# Patient Record
Sex: MALE | Race: ASIAN | NOT HISPANIC OR LATINO | URBAN - METROPOLITAN AREA
[De-identification: names, ages, dates, MRNs, and addresses within clinical notes are randomized per-mention and may not be internally consistent; named-entity substitution may affect disease eponyms.]

---

## 2017-12-07 ENCOUNTER — EMERGENCY (EMERGENCY)
Facility: HOSPITAL | Age: 32
LOS: 1 days | Discharge: HOME | End: 2017-12-07
Attending: EMERGENCY MEDICINE | Admitting: EMERGENCY MEDICINE
Payer: MEDICAID

## 2017-12-07 VITALS
SYSTOLIC BLOOD PRESSURE: 141 MMHG | RESPIRATION RATE: 18 BRPM | DIASTOLIC BLOOD PRESSURE: 96 MMHG | OXYGEN SATURATION: 99 % | HEART RATE: 81 BPM | TEMPERATURE: 98 F

## 2017-12-07 VITALS
SYSTOLIC BLOOD PRESSURE: 141 MMHG | RESPIRATION RATE: 16 BRPM | OXYGEN SATURATION: 100 % | HEART RATE: 84 BPM | DIASTOLIC BLOOD PRESSURE: 98 MMHG

## 2017-12-07 LAB
ALBUMIN SERPL ELPH-MCNC: 4.5 G/DL — SIGNIFICANT CHANGE UP (ref 3.3–5)
ALP SERPL-CCNC: 55 U/L — SIGNIFICANT CHANGE UP (ref 40–120)
ALT FLD-CCNC: 49 U/L — HIGH (ref 4–41)
AST SERPL-CCNC: 31 U/L — SIGNIFICANT CHANGE UP (ref 4–40)
BASOPHILS # BLD AUTO: 0.03 K/UL — SIGNIFICANT CHANGE UP (ref 0–0.2)
BASOPHILS NFR BLD AUTO: 0.4 % — SIGNIFICANT CHANGE UP (ref 0–2)
BILIRUB SERPL-MCNC: 0.9 MG/DL — SIGNIFICANT CHANGE UP (ref 0.2–1.2)
BUN SERPL-MCNC: 11 MG/DL — SIGNIFICANT CHANGE UP (ref 7–23)
CALCIUM SERPL-MCNC: 9 MG/DL — SIGNIFICANT CHANGE UP (ref 8.4–10.5)
CHLORIDE SERPL-SCNC: 101 MMOL/L — SIGNIFICANT CHANGE UP (ref 98–107)
CO2 SERPL-SCNC: 23 MMOL/L — SIGNIFICANT CHANGE UP (ref 22–31)
CREAT SERPL-MCNC: 0.81 MG/DL — SIGNIFICANT CHANGE UP (ref 0.5–1.3)
EOSINOPHIL # BLD AUTO: 0.19 K/UL — SIGNIFICANT CHANGE UP (ref 0–0.5)
EOSINOPHIL NFR BLD AUTO: 2.6 % — SIGNIFICANT CHANGE UP (ref 0–6)
GLUCOSE SERPL-MCNC: 105 MG/DL — HIGH (ref 70–99)
HCT VFR BLD CALC: 46.3 % — SIGNIFICANT CHANGE UP (ref 39–50)
HGB BLD-MCNC: 15.5 G/DL — SIGNIFICANT CHANGE UP (ref 13–17)
HIV1 AG SER QL: SIGNIFICANT CHANGE UP
HIV1+2 AB SPEC QL: SIGNIFICANT CHANGE UP
IMM GRANULOCYTES # BLD AUTO: 0.03 # — SIGNIFICANT CHANGE UP
IMM GRANULOCYTES NFR BLD AUTO: 0.4 % — SIGNIFICANT CHANGE UP (ref 0–1.5)
LYMPHOCYTES # BLD AUTO: 2.64 K/UL — SIGNIFICANT CHANGE UP (ref 1–3.3)
LYMPHOCYTES # BLD AUTO: 36.8 % — SIGNIFICANT CHANGE UP (ref 13–44)
MCHC RBC-ENTMCNC: 28.2 PG — SIGNIFICANT CHANGE UP (ref 27–34)
MCHC RBC-ENTMCNC: 33.5 % — SIGNIFICANT CHANGE UP (ref 32–36)
MCV RBC AUTO: 84.3 FL — SIGNIFICANT CHANGE UP (ref 80–100)
MONOCYTES # BLD AUTO: 0.62 K/UL — SIGNIFICANT CHANGE UP (ref 0–0.9)
MONOCYTES NFR BLD AUTO: 8.6 % — SIGNIFICANT CHANGE UP (ref 2–14)
NEUTROPHILS # BLD AUTO: 3.67 K/UL — SIGNIFICANT CHANGE UP (ref 1.8–7.4)
NEUTROPHILS NFR BLD AUTO: 51.2 % — SIGNIFICANT CHANGE UP (ref 43–77)
NRBC # FLD: 0 — SIGNIFICANT CHANGE UP
PLATELET # BLD AUTO: 211 K/UL — SIGNIFICANT CHANGE UP (ref 150–400)
PMV BLD: 9.5 FL — SIGNIFICANT CHANGE UP (ref 7–13)
POTASSIUM SERPL-MCNC: 4.2 MMOL/L — SIGNIFICANT CHANGE UP (ref 3.5–5.3)
POTASSIUM SERPL-SCNC: 4.2 MMOL/L — SIGNIFICANT CHANGE UP (ref 3.5–5.3)
PROT SERPL-MCNC: 7.7 G/DL — SIGNIFICANT CHANGE UP (ref 6–8.3)
RBC # BLD: 5.49 M/UL — SIGNIFICANT CHANGE UP (ref 4.2–5.8)
RBC # FLD: 14 % — SIGNIFICANT CHANGE UP (ref 10.3–14.5)
SODIUM SERPL-SCNC: 138 MMOL/L — SIGNIFICANT CHANGE UP (ref 135–145)
TROPONIN T SERPL-MCNC: < 0.06 NG/ML — SIGNIFICANT CHANGE UP (ref 0–0.06)
TROPONIN T SERPL-MCNC: < 0.06 NG/ML — SIGNIFICANT CHANGE UP (ref 0–0.06)
WBC # BLD: 7.18 K/UL — SIGNIFICANT CHANGE UP (ref 3.8–10.5)
WBC # FLD AUTO: 7.18 K/UL — SIGNIFICANT CHANGE UP (ref 3.8–10.5)

## 2017-12-07 PROCEDURE — 93010 ELECTROCARDIOGRAM REPORT: CPT

## 2017-12-07 PROCEDURE — 99285 EMERGENCY DEPT VISIT HI MDM: CPT | Mod: 25

## 2017-12-07 PROCEDURE — 71020: CPT | Mod: 26

## 2017-12-07 NOTE — ED PROVIDER NOTE - ATTENDING CONTRIBUTION TO CARE
Dr. Vega:  I have personally performed a face to face bedside history and physical examination of this patient. I have discussed the history, examination, review of systems, assessment and plan of management with the resident. I have reviewed the electronic medical record and amended it to reflect my history, review of systems, physical exam, assessment and plan.    32M denies pmh presents with chest pain.  Pt states he experienced sharp chest pain yesterday while at rest, exacerbated by turning neck.  Had another episode today lasting 2p-5p, again onset while at rest.  Had associated paresthesia sensation in LUE.  Family h/o strokes and DM.  Denies fever/chills, sob, n/v/d, recent travel.      Exam:  - nad  - rrr  - ctab  - abd soft ntnd  - no pedal edema    A/P  - chest pain, r/o ACS  - cbc, cmp, trop  - ekg  - cxr

## 2017-12-07 NOTE — ED PROVIDER NOTE - OBJECTIVE STATEMENT
32M with no PMHx p/w left sided CP x2 days 32M with no PMHx p/w left sided CP x2 days. Patient states CP is left sided and constant yesterday. Patient went to sleep and woke up w/o pain. Pain restarted today while watching TV. Today pain is intermittent and sharp, worse when he is turning his head to the right. Patient then felt numbness of his upper arm with lasted about 30 seconds and resolved spontaneously. currently w/o CP or numbness/tingling. took ASA 162mg at about 4pm.

## 2017-12-07 NOTE — ED ADULT TRIAGE NOTE - CHIEF COMPLAINT QUOTE
Pt co left and right sided chest pain since yesterday. States today had episode of tingling in left arm and tingling in his mouth. Denies pmh.

## 2018-07-18 ENCOUNTER — EMERGENCY (EMERGENCY)
Facility: HOSPITAL | Age: 33
LOS: 1 days | Discharge: HOME | End: 2018-07-18
Admitting: EMERGENCY MEDICINE
Payer: MEDICAID

## 2018-07-18 VITALS
SYSTOLIC BLOOD PRESSURE: 139 MMHG | OXYGEN SATURATION: 100 % | HEART RATE: 76 BPM | DIASTOLIC BLOOD PRESSURE: 96 MMHG | RESPIRATION RATE: 18 BRPM | TEMPERATURE: 98 F

## 2018-07-18 PROCEDURE — 99284 EMERGENCY DEPT VISIT MOD MDM: CPT | Mod: 25

## 2018-07-18 NOTE — ED ADULT TRIAGE NOTE - CHIEF COMPLAINT QUOTE
C/o right sided neck and shoulder pain with paresthesia to right fingers. Denies CP or SOB, upper extremity strength equal bilaterally, no neuro deficits noted. Denies other PMH EKG completed

## 2018-07-19 VITALS
OXYGEN SATURATION: 100 % | HEART RATE: 65 BPM | DIASTOLIC BLOOD PRESSURE: 79 MMHG | SYSTOLIC BLOOD PRESSURE: 120 MMHG | RESPIRATION RATE: 16 BRPM | TEMPERATURE: 97 F

## 2018-07-19 PROCEDURE — 73030 X-RAY EXAM OF SHOULDER: CPT | Mod: 26,RT

## 2018-07-19 RX ORDER — DIAZEPAM 5 MG
5 TABLET ORAL ONCE
Qty: 0 | Refills: 0 | Status: DISCONTINUED | OUTPATIENT
Start: 2018-07-19 | End: 2018-07-19

## 2018-07-19 RX ORDER — ACETAMINOPHEN 500 MG
1000 TABLET ORAL EVERY 6 HOURS
Qty: 0 | Refills: 0 | Status: DISCONTINUED | OUTPATIENT
Start: 2018-07-19 | End: 2018-07-19

## 2018-07-19 RX ORDER — CYCLOBENZAPRINE HYDROCHLORIDE 10 MG/1
1 TABLET, FILM COATED ORAL
Qty: 5 | Refills: 0 | OUTPATIENT
Start: 2018-07-19 | End: 2018-07-23

## 2018-07-19 RX ORDER — ACETAMINOPHEN 500 MG
650 TABLET ORAL EVERY 6 HOURS
Qty: 0 | Refills: 0 | Status: DISCONTINUED | OUTPATIENT
Start: 2018-07-19 | End: 2018-07-22

## 2018-07-19 RX ADMIN — Medication 650 MILLIGRAM(S): at 03:10

## 2018-07-19 RX ADMIN — Medication 650 MILLIGRAM(S): at 02:42

## 2018-07-19 RX ADMIN — Medication 5 MILLIGRAM(S): at 02:42

## 2018-07-19 NOTE — ED PROVIDER NOTE - OBJECTIVE STATEMENT
32 Y/O M no PMH h/o knee and shoulder surgery 3+ Years ago C/O r shoulder pain and tingling in his first and second digit for the past 1-2 days. Patient states the pain started when he woke up, He denies trauma or specific injury, denies dark urine or recent intense physical exertion, denies any other complaints at this time such as HA, CP SOB ABD PN N V D DIzz.

## 2018-07-19 NOTE — ED PROVIDER NOTE - MUSCULOSKELETAL MINIMAL EXAM
FROM actively and passively, moderate pain with Passive rom, no erythema, edema or warmth over the joint./normal range of motion

## 2018-07-19 NOTE — ED PROVIDER NOTE - PROGRESS NOTE DETAILS
Patient given Valium, patient states that he did not drive here. Patient notes reduced pain. No other changes. Patient told to f.u  with ortho and given ortho list

## 2018-07-19 NOTE — ED PROVIDER NOTE - CARE PLAN
Principal Discharge DX:	Pain in right shoulder Principal Discharge DX:	Pain in right shoulder  Assessment and plan of treatment:	Follow up with an orthopedist, referral is provided in your discharge packet. DO NOT drive, use heavy machinery or perform potentially risky activities after taking flexaril as it can make you drowsy. Advance activity as tolerated.  Continue all previously prescribed medications as directed.  Follow up with your primary care physician in 48-72 hours- bring copies of your results.  Return to the ER for worsening or persistent symptoms, and/or ANY NEW OR CONCERNING SYMPTOMS. If you have issues obtaining follow up, please call: [phone] to obtain a doctor or specialist who takes your insurance in your area.  You may call [phone] to make an appointment with the internal medicine clinic.

## 2018-07-19 NOTE — ED PROVIDER NOTE - PLAN OF CARE
Follow up with an orthopedist, referral is provided in your discharge packet. DO NOT drive, use heavy machinery or perform potentially risky activities after taking flexaril as it can make you drowsy. Advance activity as tolerated.  Continue all previously prescribed medications as directed.  Follow up with your primary care physician in 48-72 hours- bring copies of your results.  Return to the ER for worsening or persistent symptoms, and/or ANY NEW OR CONCERNING SYMPTOMS. If you have issues obtaining follow up, please call: [phone] to obtain a doctor or specialist who takes your insurance in your area.  You may call [phone] to make an appointment with the internal medicine clinic.

## 2018-07-19 NOTE — ED PROVIDER NOTE - MEDICAL DECISION MAKING DETAILS
Patient C/O atraumatic R shoulder pain, notes tingling near fingers, no signs of trauma or septic joint. plan is D/C with ortho f/u x rays unremarkable; no fx or dislocation.

## 2019-01-03 ENCOUNTER — EMERGENCY (EMERGENCY)
Facility: HOSPITAL | Age: 34
LOS: 1 days | Discharge: HOME | End: 2019-01-03
Attending: EMERGENCY MEDICINE | Admitting: EMERGENCY MEDICINE
Payer: MEDICAID

## 2019-01-03 VITALS
TEMPERATURE: 98 F | HEART RATE: 109 BPM | OXYGEN SATURATION: 97 % | SYSTOLIC BLOOD PRESSURE: 149 MMHG | DIASTOLIC BLOOD PRESSURE: 85 MMHG | RESPIRATION RATE: 18 BRPM

## 2019-01-03 PROCEDURE — 71046 X-RAY EXAM CHEST 2 VIEWS: CPT | Mod: 26

## 2019-01-03 PROCEDURE — 99283 EMERGENCY DEPT VISIT LOW MDM: CPT

## 2019-01-03 RX ORDER — KETOROLAC TROMETHAMINE 30 MG/ML
15 SYRINGE (ML) INJECTION ONCE
Qty: 0 | Refills: 0 | Status: DISCONTINUED | OUTPATIENT
Start: 2019-01-03 | End: 2019-01-03

## 2019-01-03 RX ADMIN — Medication 15 MILLIGRAM(S): at 22:47

## 2019-01-03 NOTE — ED PROVIDER NOTE - MEDICAL DECISION MAKING DETAILS
34 y/o M w/ cough and myalgias. Flu vs. other viral syndromes vs. pneumonia. Will obtain chest X-ray, give NSAIDs and reassess.

## 2019-01-03 NOTE — ED PROVIDER NOTE - OBJECTIVE STATEMENT
34 y/o M w/ no pertinent PMH presents to ED c/o cough and muscle aches, especially at the L trapezius, for 2 days duration. Pt also w/ subjective fever. Denies any sick contacts, or any other acute complaints. NKDA. Pt has not received flu vaccination this year.

## 2019-01-03 NOTE — ED PROVIDER NOTE - PLAN OF CARE
You were seen today for your cough.  This is likely due to an upper respiratory infection from a virus.  Antibiotics are not helpful in this case.  Take acetaminophen for any pain or fever.  Drink plenty of fluids.  Be sure to follow up with your primary care physician in 2-3 days for re-evaluation.  RETURN TO THE EMERGENCY DEPARTMENT IMMEDIATELY FOR SEVERE WEAKNESS, TROUBLE BREATHING, IF YOU CANNOT EAT OR DRINK, OR FOR ANY OTHER CONCERN.

## 2019-01-03 NOTE — ED ADULT TRIAGE NOTE - CHIEF COMPLAINT QUOTE
Pt c/o cough x 2 days and CP that started today. CP worsens and radiates to L shoulder when coughing. Also having subjective fever. Denies pmhx.

## 2019-01-03 NOTE — ED PROVIDER NOTE - CARE PLAN
Principal Discharge DX:	URI (upper respiratory infection)  Assessment and plan of treatment:	You were seen today for your cough.  This is likely due to an upper respiratory infection from a virus.  Antibiotics are not helpful in this case.  Take acetaminophen for any pain or fever.  Drink plenty of fluids.  Be sure to follow up with your primary care physician in 2-3 days for re-evaluation.  RETURN TO THE EMERGENCY DEPARTMENT IMMEDIATELY FOR SEVERE WEAKNESS, TROUBLE BREATHING, IF YOU CANNOT EAT OR DRINK, OR FOR ANY OTHER CONCERN.

## 2019-01-04 RX ORDER — CYCLOBENZAPRINE HYDROCHLORIDE 10 MG/1
1 TABLET, FILM COATED ORAL
Qty: 20 | Refills: 0 | OUTPATIENT
Start: 2019-01-04

## 2020-03-13 ENCOUNTER — EMERGENCY (EMERGENCY)
Facility: HOSPITAL | Age: 35
LOS: 1 days | Discharge: HOME | End: 2020-03-13
Attending: EMERGENCY MEDICINE | Admitting: EMERGENCY MEDICINE
Payer: MEDICAID

## 2020-03-13 VITALS
SYSTOLIC BLOOD PRESSURE: 141 MMHG | OXYGEN SATURATION: 100 % | HEART RATE: 78 BPM | WEIGHT: 199.96 LBS | RESPIRATION RATE: 16 BRPM | DIASTOLIC BLOOD PRESSURE: 99 MMHG | HEIGHT: 67 IN | TEMPERATURE: 99 F

## 2020-03-13 VITALS
DIASTOLIC BLOOD PRESSURE: 81 MMHG | SYSTOLIC BLOOD PRESSURE: 118 MMHG | TEMPERATURE: 99 F | RESPIRATION RATE: 16 BRPM | HEART RATE: 77 BPM | OXYGEN SATURATION: 100 %

## 2020-03-13 PROCEDURE — 99284 EMERGENCY DEPT VISIT MOD MDM: CPT

## 2020-03-13 RX ORDER — KETOROLAC TROMETHAMINE 30 MG/ML
30 SYRINGE (ML) INJECTION ONCE
Refills: 0 | Status: DISCONTINUED | OUTPATIENT
Start: 2020-03-13 | End: 2020-03-13

## 2020-03-13 RX ORDER — DEXAMETHASONE 0.5 MG/5ML
8 ELIXIR ORAL ONCE
Refills: 0 | Status: COMPLETED | OUTPATIENT
Start: 2020-03-13 | End: 2020-03-13

## 2020-03-13 NOTE — ED ADULT NURSE NOTE - PAIN RATING/NUMBER SCALE (0-10): REST
10 Bexarotene Counseling:  I discussed with the patient the risks of bexarotene including but not limited to hair loss, dry lips/skin/eyes, liver abnormalities, hyperlipidemia, pancreatitis, depression/suicidal ideation, photosensitivity, drug rash/allergic reactions, hypothyroidism, anemia, leukopenia, infection, cataracts, and teratogenicity.  Patient understands that they will need regular blood tests to check lipid profile, liver function tests, white blood cell count, thyroid function tests and pregnancy test if applicable.

## 2020-03-13 NOTE — ED ADULT NURSE NOTE - OBJECTIVE STATEMENT
c/o cough/cold symptoms since wednesday... cough with green sputum, chest and back pain... pregnant gf also has same symptoms.  [facility]

## 2020-03-14 RX ADMIN — Medication 8 MILLIGRAM(S): at 00:06

## 2020-03-14 RX ADMIN — Medication 30 MILLIGRAM(S): at 00:07

## 2020-03-14 NOTE — ED PROVIDER NOTE - CLINICAL SUMMARY MEDICAL DECISION MAKING FREE TEXT BOX
34 y/o M with body aches and chills will treat with Toradol, Decadron and outpatient follow up with PMD. DC home.

## 2020-03-14 NOTE — ED PROVIDER NOTE - OBJECTIVE STATEMENT
36 y/o M presents to the ED c/o body aches, lower back pain, chills. +sick contact with nephew and pregnant girlfriend. Otherwise no other complaints. Denies cough, CP, n/v/d, abdominal pain.

## 2020-03-14 NOTE — ED PROVIDER NOTE - PATIENT PORTAL LINK FT
You can access the FollowMyHealth Patient Portal offered by [facility] by registering at the following website: [email or web address]. By joining [facility]’s FollowMyHealth portal, you will also be able to view your health information using other applications (apps) compatible with our system.

## 2020-06-25 NOTE — ED ADULT NURSE NOTE - CHIEF COMPLAINT QUOTE
Dx: sprain IP joint R D5        Insurance (Authorized # of Visits):  PPO           Authorizing Physician: Dr. Raghu Wiggins  Next MD visit: none scheduled  Fall Risk: standard         Precautions: n/a             Subjective:\"I've been able to wear the cast and I Pt. c/o fever, sob and back pain, body aches and some chest pain x 2 weeks. Gentle mobilization of joint followed by extension of PIP Gentle mobilization of joint followed by extension of PIP ORL stretch Blocking, reverse blocking   Yellow band ex  Digit/thumb ext x 20  D5 abd x 20  Isometric extension of D5, EDC also common digit

## 2025-03-28 ENCOUNTER — OFFICE (OUTPATIENT)
Facility: LOCATION | Age: 40
Setting detail: OPHTHALMOLOGY
End: 2025-03-28
Payer: COMMERCIAL

## 2025-03-28 DIAGNOSIS — E11.9: ICD-10-CM

## 2025-03-28 DIAGNOSIS — S02.32XB: ICD-10-CM

## 2025-03-28 DIAGNOSIS — H31.001: ICD-10-CM

## 2025-03-28 PROBLEM — S02.32XG: Status: ACTIVE | Noted: 2025-03-28

## 2025-03-28 PROCEDURE — 92014 COMPRE OPH EXAM EST PT 1/>: CPT | Performed by: OPHTHALMOLOGY

## 2025-03-28 ASSESSMENT — KERATOMETRY
OD_K1POWER_DIOPTERS: 42.25
OS_AXISANGLE_DEGREES: 090
OS_K2POWER_DIOPTERS: 42.50
OS_K1POWER_DIOPTERS: 42.50
OD_AXISANGLE_DEGREES: 017
OD_K2POWER_DIOPTERS: 42.50

## 2025-03-28 ASSESSMENT — REFRACTION_AUTOREFRACTION
OS_CYLINDER: -0.25
OS_AXIS: 089
OD_SPHERE: +0.25
OD_CYLINDER: -0.50
OS_SPHERE: 0.00
OD_AXIS: 098

## 2025-03-28 ASSESSMENT — VISUAL ACUITY
OS_BCVA: 20/20-2
OD_BCVA: 20/20-2

## 2025-03-28 ASSESSMENT — CONFRONTATIONAL VISUAL FIELD TEST (CVF)
OD_FINDINGS: FULL
OS_FINDINGS: FULL